# Patient Record
Sex: MALE | Race: WHITE | URBAN - NONMETROPOLITAN AREA
[De-identification: names, ages, dates, MRNs, and addresses within clinical notes are randomized per-mention and may not be internally consistent; named-entity substitution may affect disease eponyms.]

---

## 2019-04-25 ENCOUNTER — IMPORTED ENCOUNTER (OUTPATIENT)
Dept: URBAN - NONMETROPOLITAN AREA CLINIC 1 | Facility: CLINIC | Age: 69
End: 2019-04-25

## 2019-04-25 PROBLEM — H16.223: Noted: 2019-04-25

## 2019-04-25 PROBLEM — H01.021: Noted: 2019-04-25

## 2019-04-25 PROBLEM — H52.03: Noted: 2019-04-25

## 2019-04-25 PROBLEM — H52.4: Noted: 2019-04-25

## 2019-04-25 PROBLEM — H52.221: Noted: 2019-04-25

## 2019-04-25 PROBLEM — H25.13: Noted: 2018-04-24

## 2019-04-25 PROBLEM — H01.024: Noted: 2019-04-25

## 2019-04-25 PROCEDURE — 92012 INTRM OPH EXAM EST PATIENT: CPT

## 2019-04-25 PROCEDURE — 92015 DETERMINE REFRACTIVE STATE: CPT

## 2019-04-25 NOTE — PATIENT DISCUSSION
[facility] OU- Discussed diagnosis in detail with patient- Discussed signs and symptoms of progression- Discussed UV protection- No treatment needed at this time - Continue to monitorDry Eye Syndrome 2* [facility]GD OU- Discussed diagnosis in detail with patient- Discussed signs and symptoms of progression- Recommend patient drinking plenty of water and starting Omega 3’s - Recommend Refresh or Systane  throughout the day- Consider Restasis or plugs in the future if no improvement- Continue to monitorDermatochalasis OU- Discussed diagnosis in detail with patient- No superior field of vision loss- Continue to monitorPresbyopia OU - Discussed diagnosis in detail with patient - New glasses Rx given today- Continue to monitor- RTC 1 year complete; 's Notes: MR 4/25/19DFE 4/25/19Eusebiaos

## 2020-01-02 ENCOUNTER — IMPORTED ENCOUNTER (OUTPATIENT)
Dept: URBAN - METROPOLITAN AREA CLINIC 50 | Facility: CLINIC | Age: 70
End: 2020-01-02

## 2020-06-25 ENCOUNTER — IMPORTED ENCOUNTER (OUTPATIENT)
Dept: URBAN - NONMETROPOLITAN AREA CLINIC 1 | Facility: CLINIC | Age: 70
End: 2020-06-25

## 2020-06-25 PROCEDURE — 92014 COMPRE OPH EXAM EST PT 1/>: CPT

## 2020-06-25 PROCEDURE — 92015 DETERMINE REFRACTIVE STATE: CPT

## 2020-06-25 NOTE — PATIENT DISCUSSION
[facility] OU- Discussed diagnosis in detail with patient- Discussed signs and symptoms of progression- Discussed UV protection- No treatment needed at this time - Continue to monitor- BAT at next visit Dry Eye Syndrome 2* MGD OU- Discussed diagnosis in detail with patient- Discussed signs and symptoms of progression- Recommend patient drinking plenty of water and starting Omega 3’s - Recommend Refresh or Systane  throughout the day- Consider Restasis or plugs in the future if no improvement- Continue to monitorDermatochalasis OU- Discussed diagnosis in detail with patient- No superior field of vision loss- Continue to monitorPresbyopia OU - Discussed diagnosis in detail with patient - New glasses Rx given today- Continue to monitor- RTC 1 year complete; 's Notes: MR 4/25/19DFE 4/25/19Gerard

## 2021-08-30 ENCOUNTER — NEW CATARACT EVAL (OUTPATIENT)
Dept: URBAN - METROPOLITAN AREA CLINIC 49 | Facility: CLINIC | Age: 71
End: 2021-08-30

## 2021-08-30 DIAGNOSIS — H35.363: ICD-10-CM

## 2021-08-30 DIAGNOSIS — H25.13: ICD-10-CM

## 2021-08-30 PROCEDURE — 92004 COMPRE OPH EXAM NEW PT 1/>: CPT

## 2021-08-30 ASSESSMENT — KERATOMETRY
OD_K1POWER_DIOPTERS: 42.50
OD_AXISANGLE_DEGREES: 145
OS_AXISANGLE_DEGREES: 12
OS_K1POWER_DIOPTERS: 42.50
OD_AXISANGLE2_DEGREES: 55
OS_K2POWER_DIOPTERS: 43.25
OD_K2POWER_DIOPTERS: 43.00
OS_AXISANGLE2_DEGREES: 102

## 2021-08-30 ASSESSMENT — VISUAL ACUITY
OD_PH: 20/40
OS_PH: 20/30
OU_SC: J10 @ 14IN
OS_SC: J16 @ 14IN
OD_GLARE: 20/30
OD_SC: 20/60
OS_GLARE: 20/50
OS_SC: 20/70
OD_SC: J16 @ 14IN
OU_CC: 20/20
OD_CC: 20/20
OS_GLARE: 20/50
OD_GLARE: 20/50
OS_CC: 20/20

## 2021-08-30 ASSESSMENT — TONOMETRY
OS_IOP_MMHG: 21
OD_IOP_MMHG: 20

## 2022-04-09 ASSESSMENT — TONOMETRY
OS_IOP_MMHG: 20
OD_IOP_MMHG: 18
OD_IOP_MMHG: 20
OS_IOP_MMHG: 20

## 2022-04-09 ASSESSMENT — VISUAL ACUITY
OS_CC: J1
OD_SC: 20/20
OD_CC: J1+
OS_SC: 20/20-
OD_SC: 20/25-

## 2022-09-12 ENCOUNTER — COMPREHENSIVE EXAM (OUTPATIENT)
Dept: URBAN - METROPOLITAN AREA CLINIC 49 | Facility: CLINIC | Age: 72
End: 2022-09-12

## 2022-09-12 DIAGNOSIS — H25.813: ICD-10-CM

## 2022-09-12 DIAGNOSIS — H35.363: ICD-10-CM

## 2022-09-12 PROCEDURE — 92014 COMPRE OPH EXAM EST PT 1/>: CPT

## 2022-09-12 PROCEDURE — 92134 CPTRZ OPH DX IMG PST SGM RTA: CPT

## 2022-09-12 ASSESSMENT — KERATOMETRY
OD_AXISANGLE_DEGREES: 145
OS_AXISANGLE2_DEGREES: 102
OS_K1POWER_DIOPTERS: 42.50
OD_AXISANGLE2_DEGREES: 55
OS_K2POWER_DIOPTERS: 43.25
OD_K1POWER_DIOPTERS: 42.50
OD_K2POWER_DIOPTERS: 43.00
OS_AXISANGLE_DEGREES: 12

## 2022-09-12 ASSESSMENT — VISUAL ACUITY
OS_CC: 20/20
OS_GLARE: 20/40
OD_SC: 20/60
OD_GLARE: 20/30
OU_CC: J1+
OD_CC: 20/20-2
OS_GLARE: 20/30
OS_SC: 20/40
OD_GLARE: 20/40

## 2022-09-12 ASSESSMENT — TONOMETRY
OD_IOP_MMHG: 20
OS_IOP_MMHG: 22

## 2023-09-18 ENCOUNTER — COMPREHENSIVE EXAM (OUTPATIENT)
Dept: URBAN - METROPOLITAN AREA CLINIC 49 | Facility: LOCATION | Age: 73
End: 2023-09-18

## 2023-09-18 DIAGNOSIS — H35.363: ICD-10-CM

## 2023-09-18 DIAGNOSIS — H25.813: ICD-10-CM

## 2023-09-18 PROCEDURE — 92134 CPTRZ OPH DX IMG PST SGM RTA: CPT

## 2023-09-18 PROCEDURE — 99214 OFFICE O/P EST MOD 30 MIN: CPT

## 2023-09-18 ASSESSMENT — KERATOMETRY
OD_AXISANGLE_DEGREES: 145
OD_AXISANGLE2_DEGREES: 55
OS_AXISANGLE2_DEGREES: 102
OS_K2POWER_DIOPTERS: 43.25
OS_K1POWER_DIOPTERS: 42.50
OS_AXISANGLE_DEGREES: 12
OD_K2POWER_DIOPTERS: 43.00
OD_K1POWER_DIOPTERS: 42.50

## 2023-09-18 ASSESSMENT — VISUAL ACUITY
OS_CC: 20/25-2
OS_GLARE: 20/50
OD_CC: 20/20-2
OU_CC: J1+
OD_GLARE: 20/40
OD_GLARE: 20/30
OS_GLARE: 20/40

## 2023-09-18 ASSESSMENT — TONOMETRY
OS_IOP_MMHG: 21
OD_IOP_MMHG: 20

## 2023-10-23 ENCOUNTER — PRE-OP/H&P (OUTPATIENT)
Dept: URBAN - METROPOLITAN AREA CLINIC 49 | Facility: LOCATION | Age: 73
End: 2023-10-23

## 2023-10-23 DIAGNOSIS — H35.363: ICD-10-CM

## 2023-10-23 DIAGNOSIS — H25.13: ICD-10-CM

## 2023-10-23 PROCEDURE — PREOP PRE OP VISIT

## 2023-10-23 PROCEDURE — 92134 CPTRZ OPH DX IMG PST SGM RTA: CPT

## 2023-10-23 PROCEDURE — 92136 OPHTHALMIC BIOMETRY: CPT

## 2023-10-23 ASSESSMENT — KERATOMETRY
OD_AXISANGLE_DEGREES: 150
OS_K1POWER_DIOPTERS: 43.12
OS_K2POWER_DIOPTERS: 42.12
OD_K1POWER_DIOPTERS: 43.12
OD_AXISANGLE2_DEGREES: 60
OS_AXISANGLE2_DEGREES: 25
OS_AXISANGLE_DEGREES: 115
OD_K2POWER_DIOPTERS: 42.87

## 2023-10-23 ASSESSMENT — VISUAL ACUITY
OS_CC: 20/25
OD_CC: 20/25+

## 2023-10-23 ASSESSMENT — TONOMETRY
OD_IOP_MMHG: 20
OS_IOP_MMHG: 20

## 2023-11-13 ASSESSMENT — KERATOMETRY
OS_AXISANGLE2_DEGREES: 25
OD_AXISANGLE2_DEGREES: 60
OD_K2POWER_DIOPTERS: 42.87
OS_K1POWER_DIOPTERS: 43.12
OD_K1POWER_DIOPTERS: 43.12
OD_AXISANGLE_DEGREES: 150
OS_K2POWER_DIOPTERS: 42.12
OS_AXISANGLE_DEGREES: 115

## 2023-11-14 ENCOUNTER — POST-OP (OUTPATIENT)
Dept: URBAN - METROPOLITAN AREA CLINIC 48 | Facility: LOCATION | Age: 73
End: 2023-11-14

## 2023-11-14 ENCOUNTER — SURGERY/PROCEDURE (OUTPATIENT)
Dept: URBAN - METROPOLITAN AREA SURGERY 16 | Facility: SURGERY | Age: 73
End: 2023-11-14

## 2023-11-14 DIAGNOSIS — H25.12: ICD-10-CM

## 2023-11-14 DIAGNOSIS — Z96.1: ICD-10-CM

## 2023-11-14 DIAGNOSIS — Z98.42: ICD-10-CM

## 2023-11-14 PROCEDURE — 99199PAV ADVANCED VISION

## 2023-11-14 PROCEDURE — 66984AV REMOVE CATARACT, INSERT ADVANCED LENS

## 2023-11-14 ASSESSMENT — KERATOMETRY
OS_AXISANGLE2_DEGREES: 25
OS_K2POWER_DIOPTERS: 42.12
OD_K2POWER_DIOPTERS: 42.87
OD_AXISANGLE_DEGREES: 150
OS_K1POWER_DIOPTERS: 43.12
OS_AXISANGLE_DEGREES: 115
OD_K1POWER_DIOPTERS: 43.12
OD_AXISANGLE2_DEGREES: 60

## 2023-11-14 ASSESSMENT — VISUAL ACUITY: OS_SC: 20/200

## 2023-11-14 ASSESSMENT — TONOMETRY: OS_IOP_MMHG: 29

## 2023-11-27 ENCOUNTER — POST OP/EVAL OF SECOND EYE (OUTPATIENT)
Dept: URBAN - METROPOLITAN AREA CLINIC 49 | Facility: LOCATION | Age: 73
End: 2023-11-27

## 2023-11-27 DIAGNOSIS — Z96.1: ICD-10-CM

## 2023-11-27 DIAGNOSIS — Z98.42: ICD-10-CM

## 2023-11-27 DIAGNOSIS — H25.11: ICD-10-CM

## 2023-11-27 DIAGNOSIS — H25.12: ICD-10-CM

## 2023-11-27 PROCEDURE — 99213 OFFICE O/P EST LOW 20 MIN: CPT

## 2023-11-27 ASSESSMENT — TONOMETRY
OS_IOP_MMHG: 20
OD_IOP_MMHG: 21

## 2023-11-27 ASSESSMENT — KERATOMETRY
OD_AXISANGLE_DEGREES: 141
OD_K1POWER_DIOPTERS: 42.50
OS_AXISANGLE2_DEGREES: 83
OS_K2POWER_DIOPTERS: 43.50
OS_K1POWER_DIOPTERS: 42.25
OD_K2POWER_DIOPTERS: 43.00
OD_AXISANGLE2_DEGREES: 51
OS_AXISANGLE_DEGREES: 173

## 2023-11-27 ASSESSMENT — VISUAL ACUITY
OS_SC: 20/30-2
OD_GLARE: 20/20
OS_PH: 20/25
OS_SC: 20/40-1
OS_SC: J1@14
OD_SC: 20/25
OD_GLARE: 20/20

## 2023-12-12 ENCOUNTER — POST-OP (OUTPATIENT)
Dept: URBAN - METROPOLITAN AREA CLINIC 48 | Facility: LOCATION | Age: 73
End: 2023-12-12

## 2023-12-12 ENCOUNTER — SURGERY/PROCEDURE (OUTPATIENT)
Dept: URBAN - METROPOLITAN AREA SURGERY 16 | Facility: SURGERY | Age: 73
End: 2023-12-12

## 2023-12-12 DIAGNOSIS — Z96.1: ICD-10-CM

## 2023-12-12 DIAGNOSIS — Z98.41: ICD-10-CM

## 2023-12-12 DIAGNOSIS — H25.11: ICD-10-CM

## 2023-12-12 PROCEDURE — 99199PAV ADVANCED VISION

## 2023-12-12 PROCEDURE — 66984AV REMOVE CATARACT, INSERT ADVANCED LENS

## 2023-12-12 ASSESSMENT — KERATOMETRY
OD_AXISANGLE2_DEGREES: 51
OS_K1POWER_DIOPTERS: 42.25
OD_AXISANGLE_DEGREES: 141
OS_AXISANGLE2_DEGREES: 83
OD_K2POWER_DIOPTERS: 43.00
OS_K2POWER_DIOPTERS: 43.50
OD_K1POWER_DIOPTERS: 42.50
OS_AXISANGLE_DEGREES: 173
OS_K2POWER_DIOPTERS: 43.50
OS_K1POWER_DIOPTERS: 42.25
OD_K2POWER_DIOPTERS: 43.00
OD_AXISANGLE_DEGREES: 141
OS_AXISANGLE_DEGREES: 173
OD_AXISANGLE2_DEGREES: 51
OS_AXISANGLE2_DEGREES: 83
OD_K1POWER_DIOPTERS: 42.50

## 2023-12-12 ASSESSMENT — TONOMETRY: OD_IOP_MMHG: 18

## 2023-12-12 ASSESSMENT — VISUAL ACUITY: OD_SC: 20/60

## 2023-12-18 ENCOUNTER — POST-OP (OUTPATIENT)
Dept: URBAN - METROPOLITAN AREA CLINIC 49 | Facility: LOCATION | Age: 73
End: 2023-12-18

## 2023-12-18 DIAGNOSIS — Z96.1: ICD-10-CM

## 2023-12-18 DIAGNOSIS — Z98.41: ICD-10-CM

## 2023-12-18 PROCEDURE — 99024 POSTOP FOLLOW-UP VISIT: CPT

## 2023-12-18 ASSESSMENT — KERATOMETRY
OS_K2POWER_DIOPTERS: 43.50
OS_K1POWER_DIOPTERS: 42.25
OS_AXISANGLE2_DEGREES: 83
OS_AXISANGLE_DEGREES: 173
OD_AXISANGLE_DEGREES: 141
OD_K1POWER_DIOPTERS: 42.50
OD_AXISANGLE2_DEGREES: 51
OD_K2POWER_DIOPTERS: 43.00

## 2023-12-18 ASSESSMENT — VISUAL ACUITY
OD_SC: 20/25
OS_SC: 20/25-2
OD_SC: J2-
OD_SC: J1-

## 2023-12-18 ASSESSMENT — TONOMETRY
OD_IOP_MMHG: 20
OS_IOP_MMHG: 20

## 2024-01-08 ENCOUNTER — POST-OP (OUTPATIENT)
Dept: URBAN - METROPOLITAN AREA CLINIC 49 | Facility: LOCATION | Age: 74
End: 2024-01-08

## 2024-01-08 DIAGNOSIS — Z96.1: ICD-10-CM

## 2024-01-08 DIAGNOSIS — Z98.41: ICD-10-CM

## 2024-01-08 PROCEDURE — 99024 POSTOP FOLLOW-UP VISIT: CPT

## 2024-01-08 PROCEDURE — 92015 DETERMINE REFRACTIVE STATE: CPT

## 2024-01-08 ASSESSMENT — KERATOMETRY
OD_AXISANGLE_DEGREES: 141
OD_AXISANGLE2_DEGREES: 51
OS_AXISANGLE2_DEGREES: 83
OS_K1POWER_DIOPTERS: 42.25
OS_AXISANGLE_DEGREES: 173
OS_K2POWER_DIOPTERS: 43.50
OD_K2POWER_DIOPTERS: 43.00
OD_K1POWER_DIOPTERS: 42.50

## 2024-01-08 ASSESSMENT — VISUAL ACUITY
OD_SC: 20/20-1
OS_PH: 20/25
OS_SC: 20/40

## 2024-01-08 ASSESSMENT — TONOMETRY
OS_IOP_MMHG: 18
OD_IOP_MMHG: 17

## 2024-05-13 ENCOUNTER — CONSULTATION/EVALUATION (OUTPATIENT)
Dept: URBAN - METROPOLITAN AREA CLINIC 49 | Facility: LOCATION | Age: 74
End: 2024-05-13

## 2024-05-13 DIAGNOSIS — H26.493: ICD-10-CM

## 2024-05-13 DIAGNOSIS — H04.123: ICD-10-CM

## 2024-05-13 DIAGNOSIS — H35.363: ICD-10-CM

## 2024-05-13 PROCEDURE — 99214 OFFICE O/P EST MOD 30 MIN: CPT

## 2024-05-13 PROCEDURE — 92134 CPTRZ OPH DX IMG PST SGM RTA: CPT

## 2024-05-13 ASSESSMENT — VISUAL ACUITY
OD_SC: J4
OD_PH: 20/20-1
OS_SC: J2
OS_SC: 20/25-1
OU_SC: J2 @ 16"
OD_GLARE: 20/40
OS_SC: J7
OD_SC: 20/30-2
OD_SC: J4
OS_GLARE: 20/25
OD_GLARE: 20/40
OS_GLARE: 20/25

## 2024-05-13 ASSESSMENT — TONOMETRY
OS_IOP_MMHG: 16
OD_IOP_MMHG: 17

## 2024-05-13 ASSESSMENT — KERATOMETRY
OS_K2POWER_DIOPTERS: 43.50
OS_K1POWER_DIOPTERS: 42.25
OD_K2POWER_DIOPTERS: 43.00
OS_AXISANGLE2_DEGREES: 83
OD_AXISANGLE_DEGREES: 141
OS_AXISANGLE_DEGREES: 173
OD_K1POWER_DIOPTERS: 42.50
OD_AXISANGLE2_DEGREES: 51

## 2025-05-19 ENCOUNTER — COMPREHENSIVE EXAM (OUTPATIENT)
Age: 75
End: 2025-05-19

## 2025-05-19 DIAGNOSIS — Z96.1: ICD-10-CM

## 2025-05-19 DIAGNOSIS — H26.493: ICD-10-CM

## 2025-05-19 DIAGNOSIS — H35.363: ICD-10-CM

## 2025-05-19 DIAGNOSIS — H04.123: ICD-10-CM

## 2025-05-19 PROCEDURE — 99214 OFFICE O/P EST MOD 30 MIN: CPT

## 2025-05-19 PROCEDURE — 92134 CPTRZ OPH DX IMG PST SGM RTA: CPT
